# Patient Record
Sex: FEMALE
[De-identification: names, ages, dates, MRNs, and addresses within clinical notes are randomized per-mention and may not be internally consistent; named-entity substitution may affect disease eponyms.]

---

## 2021-03-04 ENCOUNTER — NON-APPOINTMENT (OUTPATIENT)
Age: 36
End: 2021-03-04

## 2021-03-04 PROBLEM — Z00.00 ENCOUNTER FOR PREVENTIVE HEALTH EXAMINATION: Status: ACTIVE | Noted: 2021-03-04

## 2021-03-05 ENCOUNTER — APPOINTMENT (OUTPATIENT)
Dept: OTOLARYNGOLOGY | Facility: CLINIC | Age: 36
End: 2021-03-05
Payer: COMMERCIAL

## 2021-03-05 VITALS — TEMPERATURE: 97.9 F | BODY MASS INDEX: 23.98 KG/M2 | WEIGHT: 127 LBS | HEIGHT: 61 IN

## 2021-03-05 DIAGNOSIS — Z87.09 PERSONAL HISTORY OF OTHER DISEASES OF THE RESPIRATORY SYSTEM: ICD-10-CM

## 2021-03-05 DIAGNOSIS — Z87.891 PERSONAL HISTORY OF NICOTINE DEPENDENCE: ICD-10-CM

## 2021-03-05 DIAGNOSIS — K21.9 GASTRO-ESOPHAGEAL REFLUX DISEASE W/OUT ESOPHAGITIS: ICD-10-CM

## 2021-03-05 DIAGNOSIS — Z86.59 PERSONAL HISTORY OF OTHER MENTAL AND BEHAVIORAL DISORDERS: ICD-10-CM

## 2021-03-05 PROCEDURE — 99204 OFFICE O/P NEW MOD 45 MIN: CPT | Mod: 25

## 2021-03-05 PROCEDURE — 31231 NASAL ENDOSCOPY DX: CPT

## 2021-03-05 PROCEDURE — 99072 ADDL SUPL MATRL&STAF TM PHE: CPT

## 2021-03-05 RX ORDER — ACETAMINOPHEN 80 MG
2300-700 TABLET,CHEWABLE ORAL
Refills: 0 | Status: ACTIVE | COMMUNITY

## 2021-03-05 RX ORDER — FLUTICASONE PROPIONATE 50 UG/1
50 SPRAY, METERED NASAL DAILY
Qty: 1 | Refills: 3 | Status: ACTIVE | COMMUNITY
Start: 2021-03-05 | End: 1900-01-01

## 2021-03-05 RX ORDER — AZELASTINE HYDROCHLORIDE 137 UG/1
0.1 SPRAY, METERED NASAL
Qty: 1 | Refills: 3 | Status: ACTIVE | COMMUNITY
Start: 2021-03-05 | End: 1900-01-01

## 2021-03-05 RX ORDER — FAMOTIDINE 20 MG/1
20 TABLET, FILM COATED ORAL
Qty: 60 | Refills: 3 | Status: ACTIVE | COMMUNITY
Start: 2021-03-05 | End: 1900-01-01

## 2021-03-05 NOTE — ASSESSMENT
[FreeTextEntry1] : She has a long history of chronic rhinitis and chronic sinusitis. Her symptoms are constant. She had allergy testing several years ago which was positive. A nasal endoscopy, there was nasal mucosal edema and stringy opaque mucus throughout. The nasal passages were crowded. She also had reflux-related laryngeal changes\par \par PLAN\par \par -findings and management options discussed in detail with the patient. \par -Nasal saline rinses and antihistamine/decongestant as needed\par -trial of flonase and astelin. She was asked to avoid using topical decongestant nasal sprays\par -the patient will be placed on a course of augmentin and a burst of prednisone for the findings on exam. She may have a chronic low grade infection. They were asked to review the associated risks of steroids (given by the pharmacy with the medication).  There is a risk of fractures, mood changes and GI complications. She was told to take the medication with food. I am also placing her on pepcid\par -Allergy evaluation\par -CT scan of the sinuses once she has finished the medication\par -Reflux precautions and trial of pepcid\par -We will see how she does with treatment. She may benefit from sinus procedures.\par -follow up after the CT scan\par -call and return earlier if any concerns. \par

## 2021-03-05 NOTE — HISTORY OF PRESENT ILLNESS
[de-identified] : YAHIR DIANA is a 35 year patient With a long history of chronic rhinitis. She is constant and year-round bilateral nasal congestion and obstruction.  She has occasional sinus pressure. She is not sure if she gets infections. Her symptoms have been getting worse. She had allergy testing many years ago which was positive. She has no history of nasal trauma or nasal/sinus surgery. She denies a known history of reflux. She does not smoke or have pets. She has used nasal saline rinses which help sometimes. She uses topical decongestant spray as needed. That does help. She has not used a nasal steroid spray or decongestants/antihistamines. She tried Breathe Right strips but they did not help.

## 2021-03-13 ENCOUNTER — RESULT REVIEW (OUTPATIENT)
Age: 36
End: 2021-03-13

## 2021-03-13 ENCOUNTER — OUTPATIENT (OUTPATIENT)
Dept: OUTPATIENT SERVICES | Facility: HOSPITAL | Age: 36
LOS: 1 days | End: 2021-03-13

## 2021-03-13 ENCOUNTER — APPOINTMENT (OUTPATIENT)
Dept: CT IMAGING | Facility: CLINIC | Age: 36
End: 2021-03-13
Payer: COMMERCIAL

## 2021-03-13 PROCEDURE — 70486 CT MAXILLOFACIAL W/O DYE: CPT | Mod: 26

## 2021-03-15 ENCOUNTER — NON-APPOINTMENT (OUTPATIENT)
Age: 36
End: 2021-03-15

## 2021-03-16 ENCOUNTER — APPOINTMENT (OUTPATIENT)
Dept: OTOLARYNGOLOGY | Facility: CLINIC | Age: 36
End: 2021-03-16
Payer: COMMERCIAL

## 2021-03-16 DIAGNOSIS — J31.0 CHRONIC RHINITIS: ICD-10-CM

## 2021-03-16 DIAGNOSIS — J32.4 CHRONIC PANSINUSITIS: ICD-10-CM

## 2021-03-16 PROCEDURE — 99442: CPT

## 2021-03-16 RX ORDER — AMOXICILLIN AND CLAVULANATE POTASSIUM 875; 125 MG/1; MG/1
875-125 TABLET, COATED ORAL
Qty: 20 | Refills: 0 | Status: COMPLETED | COMMUNITY
Start: 2021-03-05 | End: 2021-03-16

## 2021-03-16 RX ORDER — PREDNISONE 10 MG/1
10 TABLET ORAL
Qty: 12 | Refills: 0 | Status: COMPLETED | COMMUNITY
Start: 2021-03-05 | End: 2021-03-16

## 2021-03-16 NOTE — ASSESSMENT
[FreeTextEntry1] : She has a history of chronic rhinitis and chronic sinusitis. She feels better after taking the antibiotics and steroids. She is also using Astelin and Flonase. I reviewed the CT scan findings with her.\par \par PLAN\par \par -findings and management options discussed in detail with the patient. \par -Nasal saline rinses and antihistamine/decongestant as needed\par -flonase and astelin. \par -Allergy evaluation recommended. \par -We discussed possible surgery. She may consider septoplasty and bilateral inferior turbinate reduction.  Balloon sinuplasty may be helpful as well if she has recurrent infections. I discussed the risk/benefits of surgery. I recommended allergy evaluation first\par -I would like to see her back after the allergy evaluation. We will review the CT scan images and again discuss her options\par -call and return earlier if any concerns. I would also like to see her back if she has a sinus infection

## 2021-03-16 NOTE — HISTORY OF PRESENT ILLNESS
[de-identified] : Visit initiated at patient request.  This Telephone visit is occurring at the patient's request. There are limitations of telemedicine encounters including the risks associated with the technology platform and lack of a physical exam.  The patient may need further testing and work up to arrive at a diagnosis and treatment plan.  The patient was made aware when the appointment was scheduled that this will be billed as a visit.  The patient understood and elected to proceed. \par \par YAHIR DIANA is a 35 year patient With a history of chronic rhinosinusitis. She had the CT scan. She took the antibiotics and steroids. She feels that they helped a lot. She is breathing better. She is using Astelin and Flonase. She has not yet gone for allergy evaluation. She had  the sinus CT scan. We reviewed the results. The CT scan showed a deviated septum to the left, very mild mucosal thickening of the left inferior maxillary sinus. Narrow but patent OMCs, bilateral middle turbinate mary bullosa, generalized mucosal thickening throughout the nasal cavity. There was also an incidental finding of a calculus with the left submandibular gland duct and atrophy of the left submandibular gland.  I reviewed the findings with her. She does not get recurrent sialoadenitis. She may have had inflammation in the past.\par